# Patient Record
Sex: MALE | Race: WHITE
[De-identification: names, ages, dates, MRNs, and addresses within clinical notes are randomized per-mention and may not be internally consistent; named-entity substitution may affect disease eponyms.]

---

## 2019-06-09 ENCOUNTER — HOSPITAL ENCOUNTER (EMERGENCY)
Dept: HOSPITAL 62 - ER | Age: 24
LOS: 1 days | Discharge: HOME | End: 2019-06-10
Payer: OTHER GOVERNMENT

## 2019-06-09 DIAGNOSIS — I80.8: Primary | ICD-10-CM

## 2019-06-09 DIAGNOSIS — N48.89: ICD-10-CM

## 2019-06-09 PROCEDURE — 99284 EMERGENCY DEPT VISIT MOD MDM: CPT

## 2019-06-09 PROCEDURE — 93976 VASCULAR STUDY: CPT

## 2019-06-09 PROCEDURE — 87088 URINE BACTERIA CULTURE: CPT

## 2019-06-09 PROCEDURE — 81001 URINALYSIS AUTO W/SCOPE: CPT

## 2019-06-09 PROCEDURE — 87086 URINE CULTURE/COLONY COUNT: CPT

## 2019-06-09 PROCEDURE — 76857 US EXAM PELVIC LIMITED: CPT

## 2019-06-10 VITALS — DIASTOLIC BLOOD PRESSURE: 73 MMHG | SYSTOLIC BLOOD PRESSURE: 101 MMHG

## 2019-06-10 LAB
APPEARANCE UR: (no result)
APTT PPP: YELLOW S
BILIRUB UR QL STRIP: NEGATIVE
GLUCOSE UR STRIP-MCNC: NEGATIVE MG/DL
KETONES UR STRIP-MCNC: NEGATIVE MG/DL
NITRITE UR QL STRIP: NEGATIVE
PH UR STRIP: 5 [PH] (ref 5–9)
PROT UR STRIP-MCNC: NEGATIVE MG/DL
SP GR UR STRIP: 1.02
UROBILINOGEN UR-MCNC: NEGATIVE MG/DL (ref ?–2)

## 2019-06-10 NOTE — RADIOLOGY REPORT (SQ)
EXAM DESCRIPTION:

US PELVIS LIMITED



COMPLETED DATE/TME:  06/10/2019 00:02



CLINICAL HISTORY:

23 years Male, pain/swelling over shaft of penis



Comparison: None.

LIMITATIONS: Sidedness not confirmed.



FINDINGS:



4.5 x 0.9 cm noncompressible tubular structure marked "anterior

penis area of pain"  may indicate thrombosis of the deep dorsal

vein, consistent with clinically described history of penile

thrombosis.



IMPRESSION:



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

Dorsal Penile Vein Thrombophlebitis.

## 2020-08-26 ENCOUNTER — HOSPITAL ENCOUNTER (EMERGENCY)
Dept: HOSPITAL 62 - ER | Age: 25
Discharge: HOME | End: 2020-08-26
Payer: OTHER GOVERNMENT

## 2020-08-26 VITALS — DIASTOLIC BLOOD PRESSURE: 71 MMHG | SYSTOLIC BLOOD PRESSURE: 124 MMHG

## 2020-08-26 DIAGNOSIS — L23.7: Primary | ICD-10-CM

## 2020-08-26 DIAGNOSIS — F17.290: ICD-10-CM

## 2020-08-26 PROCEDURE — 99283 EMERGENCY DEPT VISIT LOW MDM: CPT

## 2020-08-26 NOTE — ER DOCUMENT REPORT
ED Skin Rash/Insect Bite/Abscs





- General


Chief Complaint: Rash


Stated Complaint: RASH


Time Seen by Provider: 08/26/20 17:10


Mode of Arrival: Ambulatory


Information source: Patient


Notes: 





24-year-old male presented to ED for poison ivy to both arms left thigh and his 

penis.  States he was out pulling some turner and then he noticed the rash on his

arms and then on his thigh and now it is on his penis.  He states he and his 

wife did clean the couch because they thought it was poison ivy and they thought

that is why it was going to other places.


TRAVEL OUTSIDE OF THE [place] IN LAST 30 DAYS: No





- HPI


Patient complains to provider of: Skin rash/lesion, Other - Poison ivy


Onset: Other - Couple days


Onset/Duration: Gradual


Quality of pain: No pain


Severity: None


Pain Level: Denies


Skin Character: Rash


Quality of rash: Itchy


Identify cause: Yes


Other exposure: Poison ivy


Exacerbated by: Denies


Relieved by: Denies


Similar symptoms previously: Yes


Recently seen / treated by doctor: No





- Related Data


Allergies/Adverse Reactions: 


                                        





No Known Allergies Allergy (Verified 08/26/20 17:04)


   











Past Medical History





- General


Information source: Patient





- Social History


Smoking Status: Current Some Day Smoker - Also vapes


Frequency of alcohol use: Occasional


Drug Abuse: None


Occupation: Active duty


Lives with: Family


Family History: Reviewed & Not Pertinent


Patient has suicidal ideation: No


Patient has homicidal ideation: No





- Past Medical History


Cardiac Medical History: Reports: None


Pulmonary Medical History: Reports: None


EENT Medical History: Reports: None


Neurological Medical History: Reports: None


Endocrine Medical History: Reports: None


Renal/ Medical History: Reports: None


Malignancy Medical History: Reports None


GI Medical History: Reports: None


Musculoskeletal Medical History: Reports Hx Musculoskeletal Trauma


Skin Medical History: Reports None


Psychiatric Medical History: Reports: None


Traumatic Medical History: Reports: Hx Fractures - Arm


Infectious Medical History: Reports: None


Surgical Hx: Negative


Past Surgical History: Reports: None





- Immunizations


Immunizations up to date: Yes


Hx Diphtheria, Pertussis, Tetanus Vaccination: Yes





Review of Systems





- Review of Systems


Constitutional: No symptoms reported


EENT: No symptoms reported


Cardiovascular: No symptoms reported


Respiratory: No symptoms reported


Gastrointestinal: No symptoms reported


Genitourinary: No symptoms reported


Male Genitourinary: No symptoms reported


Musculoskeletal: No symptoms reported


Skin: Rash - Both arms left thigh and penis


Hematologic/Lymphatic: No symptoms reported


Neurological/Psychological: No symptoms reported





Physical Exam





- Vital signs


Vitals: 


                                        











Temp Pulse Resp BP Pulse Ox


 


 98.1 F   72   16   124/71   98 


 


 08/26/20 17:02  08/26/20 17:02  08/26/20 17:02  08/26/20 17:02  08/26/20 17:02











Interpretation: Normal





- General


General appearance: Appears well, Alert





- HEENT


Head: Normocephalic, Atraumatic


Eyes: Normal


Pupils: PERRL





- Respiratory


Respiratory status: No respiratory distress


Chest status: Nontender


Breath sounds: Normal


Chest palpation: Normal





- Cardiovascular


Rhythm: Regular


Heart sounds: Normal auscultation


Murmur: No





- Abdominal


Inspection: Normal


Distension: No distension


Bowel sounds: Normal


Tenderness: Nontender


Organomegaly: No organomegaly





- Back


Back: Normal, Nontender





- Extremities


General upper extremity: Normal inspection, Nontender, Normal color, Normal ROM,

Normal temperature


General lower extremity: Normal inspection, Nontender, Normal color, Normal ROM,

Normal temperature, Normal weight bearing.  No: Jesus's sign





- Neurological


Neuro grossly intact: Yes


Cognition: Normal


Orientation: AAOx4


Michell Coma Scale Eye Opening: Spontaneous


Michell Coma Scale Verbal: Oriented


[place] Coma Scale Motor: Obeys Commands


[place] Coma Scale Total: 15


Speech: Normal


Motor strength normal: LUE, RUE, LLE, RLE


Sensory: Normal





- Psychological


Associated symptoms: Normal affect, Normal mood





- Skin


Skin Temperature: Warm


Skin Moisture: Dry


Skin Color: Normal


Skin irregularity: Rash


Location of irregularity: Extremities - Both arms left thigh and penis


Character of irregularity: Vesicular





Course





- Vital Signs


Vital signs: 


                                        











Temp Pulse Resp BP Pulse Ox


 


 98.1 F   72   16   124/71   98 


 


 08/26/20 17:02  08/26/20 17:02  08/26/20 17:02  08/26/20 17:02  08/26/20 17:02














Discharge





- Discharge


Clinical Impression: 


 Poison ivy





Condition: Stable


Disposition: HOME, SELF-CARE


Additional Instructions: 


Poison Ivy





     Poison ivy and poison oak can cause an itchy rash. This is called contact 

dermatitis. It's an allergy to an oil in the plant's leaves. The oil can be 

spread from clothing to skin, from pets to humans, or from one spot on the body 

to another. Washing thoroughly with soap immediately after exposure can prevent 

the rash. (Clothing should be washed as well.)


     If the oil is not removed, an itchy rash develops a few days after the 

exposure. Blisters may develop. Two to three weeks may be required for healing.


     Generally, treatment consists of: 


     (1) an immediate thorough washing with soap to remove the oil, 


     (2) application of a cortisone cream, and 


     (3) antihistamines for itching.


     If the reaction is particularly severe, oral cortisone medicine may be 

required. If there are oozing areas, these can be soaked in epsom salts or 

Burrow's solution.


     Call the doctor if the rash worsens despite treatment, or if signs of 

infection occur such as spreading redness, red streaks, swollen glands, 

swelling, or fever.





ACUTE ALLERGIC REACTION:





     Your symptoms are due to an allergic reaction.  Allergy can cause hives, 

swelling of the hands, feet, and face, hoarseness, and difficulty swallowing or 

breathing.  It may be due to exposure to medication, animal dander, foods, 

infection, or insect bites. Medication is a common cause, even when prior use of

this same medication caused no problems.


     Acute treatment may include adrenalin and antihistamines. Usually, the 

specific allergic agent can't be identified unless repeated episodes occur.


     Home treatment includes the following: 


(1) Stop any suspicious medications.  This will be discussed with you.  


(2) Oral antihistamines for the next four to five days. Example, diphenhydramine

(Benadryl) every four hours.


(3) You may also use cimetidine (Tagamet), ranitidine (Zantac), or famotidine (P

epcid) every four hours if diphenhydramine is not controlling itching and hives.




(4) Avoid aspirin until the hives completely disappear.  


(5) Avoid hot baths or showers until the hives are completely gone.


     Call the doctor if faintness, difficulty swallowing, tightness in the 

chest, or wheezing occurs.





STEROID MEDICATION:


     You have been given a medicine of the cortisone/steroid class.  This 

medication is used to control inflammation or allergy.  It is usually only given

for a short period of time, until the acute process subsides.


     There are usually no side effects from short-term use of cortisone-like 

medications.  Some persons feel an increased sense of well-being and are not 

sleepy at bedtime.  Long-term use of cortisone medications is best avoided, 

unless required for a severe condition.  If your condition does not remit, or 

relapses after the course of corticosteroid medication, you should consult your 

physician.








ACID-SUPPRESSING MEDICATION:


     You have a prescription for medicine which reduces the stomach's secretion 

of acid.  Examples include Zantac, Tagament, and Pepcid.  These drugs are often 

used to allow healing of ulcers or esophagitis.  They may be needed to prevent 

recurrence of ulcers in some patients, or to prevent damage from acid reflux in 

the esophagus.


     Take all medication as prescribed, even after the pain is gone. Regular 

antacids may be added as needed if you have symptoms while taking this medicine.


     These medications sometimes are prescribed for allergic reactions because 

they have anti-histaminic effects and relieve the rash and itching of the 

reaction.


     There are usually no side effects from this medication.  But, in rare cases

and particularly in the elderly, serious problems can occur. Contact your doctor

if there is fever, rash, hallucinations, confusion, or unusual bruising.


     Contact your doctor at once if you develop lightheadedness, black or bloody

stool, or bloody vomitus.








USE OF DIPHENHYDRAMINE:


     The use of diphenhydramine (Benadryl) has been recommended to control 

allergic symptoms.  The 25 mg strength is available over- the-counter, as well 

as the elixir.  This antihistamine is used for many symptoms.  It's useful for 

itching, watering eyes and nose, allergic swelling, hives, and insect stings.  

The medication can be repeated four times daily.


     Age         Elixir (12.5 mg/tsp)         25 mg pill


     2-3 yr      1/2 tsp


     4-8 yr      1 tsp


     9-14 yr     2 tsp                        one tab


     adult                                    1-2 tabs


     Antihistamines may cause drowsiness, especially with the first dose.  Do 

not operate machinery or drive while under the effects of the medication.  Do 

not combine the medication with alcohol, or with any other medication without 

talking to your doctor.








FOLLOW-UP CARE:


If you have been referred to a physician for follow-up care, call the 

physicians office for an appointment as you were instructed or within the next 

two days.  If you experience worsening or a significant change in your symptoms,

notify the physician immediately or return to the Emergency Department at any 

time for re-evaluation.





Prescriptions: 


Prednisone [Deltasone 10 mg Tablet] 10 mg PO ASDIR PRN #21 tablet


 PRN Reason: 


Famotidine [Pepcid 20 mg Tablet] 20 mg PO DAILY #12 tablet


Forms:  Smoking Cessation Education

## 2020-09-01 ENCOUNTER — HOSPITAL ENCOUNTER (EMERGENCY)
Dept: HOSPITAL 62 - ER | Age: 25
Discharge: HOME | End: 2020-09-01
Payer: OTHER GOVERNMENT

## 2020-09-01 VITALS — DIASTOLIC BLOOD PRESSURE: 62 MMHG | SYSTOLIC BLOOD PRESSURE: 124 MMHG

## 2020-09-01 DIAGNOSIS — L23.7: Primary | ICD-10-CM

## 2020-09-01 PROCEDURE — 99284 EMERGENCY DEPT VISIT MOD MDM: CPT

## 2020-09-01 PROCEDURE — 96372 THER/PROPH/DIAG INJ SC/IM: CPT

## 2020-09-01 NOTE — ER DOCUMENT REPORT
HPI





- HPI


Time Seen by Provider: 09/01/20 13:07


Notes: 





24-year-old male presents to the emergency room for reevaluation of a rash on 

his bilateral arms and abdomen and upper thighs, was seen in the emergency room 

on August 26th 2020, was given oral steroids which do to give him some relief 

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 

states his rash has been getting worse.  Denies any new interactions with any 

poison ivy.  Has not tried any Benadryl or over-the-counter medications.  

Reports rash is itchy.  Vaccinations are up-to-date for age.  Denies fevers, 

chills,  chest pain,palpitations,  shortness of breath, dyspnea, nausea, 

vomiting, diarrhea, abdominal pain, hematuria,blurred vision, double vision, 

loss of vision, speech changes, LH, dizziness, syncope, headaches, wheezing, ST,

URI, neck pain, weakness, bowel or bladder dysfunction, saddle anesthesia, 

numbness or tingling in bilateral upper or lower extremities equally, muscle 

paralysis, weakness in bilateral upper or lower extremities equally. Denies IV 

drug use.








MEDICATIONS: I agree with the patient medications as charted by the RN.





ALLERGIES: I agree with the allergies as charted by the RN.





PAST MEDICAL HISTORY/PAST SURGICAL HISTORY: Reviewed and agree as charted by RN.





SOCIAL HISTORY: Reviewed and agree as charted by RN.





FAMILY HISTORY: No significant familial comorbid conditions directly related to 

patient complaint





EXAM:


Reviewed vital signs as charted by RN.





REVIEW OF SYSTEMS:reviewed vital signs by RN


CONSTITUTIONAL :  Denies fever,  chills, or sweats.  Denies recent illness.


EENT:   Denies eye, ear, throat, or mouth pain or symptoms.  Denies nasal or 

sinus congestion or discharge.  Denies throat, tongue, or mouth swelling or 

difficulty swallowing.


CARDIOVASCULAR:  Denies chest pain.  Denies palpitations or racing or irregular 

heart beat.  Denies ankle edema.


RESPIRATORY:  Denies cough, cold, or chest congestion.  Denies shortness of 

breath, difficulty breathing, or wheezing.


GASTROINTESTINAL:  Denies abdominal pain or distention.  Denies nausea, 

vomiting, or diarrhea.  Denies blood in vomitus, stools, or per rectum.  Denies 

black, tarry stools.  Denies constipation.  


GENITOURINARY:  Denies difficulty urinating, painful urination, burning, 

frequency, blood in urine, or discharge.


MUSCULOSKELETAL:  Denies back or neck pain or stiffness.  Denies joint pain or 

swelling.


SKIN: Reports rash on arms, legs, abdomen.  denies rash, lesions or sores.


HEMATOLOGIC :   Denies easy bruising or bleeding.


LYMPHATIC:  Denies swollen, enlarged glands.


NEUROLOGICAL:  Denies confusion or altered mental status.  Denies passing out or

loss of consciousness.  Denies dizziness or lightheadedness.  Denies headache.  

Denies weakness or paralysis or loss of use of either side.  Denies problems 

with gait or speech.  Denies sensory loss, numbness, or tingling.  Denies 

seizures.


PSYCHIATRIC:  Denies anxiety or stress.  Denies depression, suicidal ideation, 

or homicidal ideation.





ALL OTHER SYSTEMS REVIEWED AND NEGATIVE.





Dictation was performed using Dragon voice recognition software 





PHYSICAL EXAMINATION:





GENERAL: Well-appearing, well-nourished and in no acute distress.





HEAD: Atraumatic, normocephalic.





EYES: Pupils equal round and reactive to light, extraocular movements intact, 

sclera anicteric, conjunctiva are normal.





ENT: Nares patent, oropharynx clear without exudates.  Moist mucous membranes.





NECK: Normal range of motion, supple without lymphadenopathy





LUNGS: Breath sounds clear to auscultation bilaterally and equal.  No wheezes 

rales or rhonchi.





HEART: Regular rate and rhythm without murmurs





ABDOMEN: Soft, nontender, nondistended abdomen.  No guarding, no rebound.  No 

masses appreciated.





Musculoskeletal: Normal range of motion, no pitting or edema.  No cyanosis.





NEUROLOGICAL: Cranial nerves grossly intact.  Normal speech, normal gait.  

Normal sensory, motor exams 





PSYCH: Normal mood, normal affect.





SKIN: Warm, Dry, normal turgor, no rashes or lesions noted.   Macular papular 

rash in leaf-like pattern on forearms, abdomen  and upper thighs.  No satellite 

lesions, burrowing, open wounds or drainage.











- REPRODUCTIVE


Reproductive: DENIES: Pregnant:





Past Medical History





- General


Information source: Patient





- Social History


Smoking Status: Unknown if Ever Smoked


Family History: Reviewed & Not Pertinent


Renal/ Medical History: Denies: Hx Peritoneal Dialysis


Musculoskeletal Medical History: Reports Hx Musculoskeletal Trauma


Traumatic Medical History: Reports: Hx Fractures - Arm





- Immunizations


Immunizations up to date: Yes


Hx Diphtheria, Pertussis, Tetanus Vaccination: Yes





Vertical Provider Document





- CONSTITUTIONAL


Agree With Documented VS: Yes


Exam Limitations: No Limitations


General Appearance: WD/WN





- INFECTION CONTROL


TRAVEL OUTSIDE OF THE [place] IN LAST 30 DAYS: No





Course





- Re-evaluation


Re-evalutation: 





09/01/20 14:07


Afebrile vital stable no distress.  Nurses notes reviewed.  Patient given 10 mg 

Decadron IM as well as given oral prednisone and steroid ointment for his poison

oak, advised to also take Benadryl over-the-counter as needed.   Vitals 

otherwise within normal limits at time of arrival.  No respiratory, GI, 

cardiovascular, or oral pharyngeal symptoms.  Will recommend ongoing 

antihistamine and steroids therapy as an outpatient. At this time will discharge

with return precautions and follow-up recommendations.  Verbal discharge 

instructions given a the bedside and opportunity for questions given. Medication

warnings reviewed. Patient is in agreement with this plan and has verbalized 

understanding of return precautions and the need for primary care follow-up in 

the next 24-72 hours.  After performing a Medical Screening Examination, I 

estimate there is LOW risk for any life threatening rash. At this time the 

patient looks extremely well and there are no signs of systemic infection, 

however this may change at any time and the rash may change.  I have reevaluated

this patient multiple times and no significant life threatening changes are 

noted. The patient and I have discussed the diagnosis and risks, and we agree 

with discharging home with close follow-up with the understanding that symptoms 

and presentations can change. We also discussed returning to the Emergency 

Department immediately if new or worsening symptoms occur. We have discussed the

symptoms which are most concerning (e.g., changing or worsening pain, fever, 

numbness, weakness, cool or painful digits) that necessitate immediate return.





- Vital Signs


Vital signs: 


                                        











Temp Pulse Resp BP Pulse Ox


 


 98.5 F   70   16   124/62   98 


 


 09/01/20 12:14  09/01/20 12:14  09/01/20 12:14  09/01/20 12:14  09/01/20 12:14














Discharge





- Discharge


Clinical Impression: 


 Poison ivy





Condition: Stable


Disposition: HOME, SELF-CARE


Instructions:  Corticosteroid Medication ([facility]), Topical Steroid Cream or 

Ointment ([facility]), Contact Dermatitis ([facility])


Additional Instructions: 


Poison Ivy





     Poison ivy and poison oak can cause an itchy rash. This is called contact 

dermatitis. It's an allergy to an oil in the plant's leaves. The oil can be 

spread from clothing to skin, from pets to humans, or from one spot on the body 

to another. Washing thoroughly with soap immediately after exposure can prevent 

the rash. (Clothing should be washed as well.)


     If the oil is not removed, an itchy rash develops a few days after the 

exposure. Blisters may develop. Two to three weeks may be required for healing.


     Generally, treatment consists of: 


     (1) an immediate thorough washing with soap to remove the oil, 


     (2) application of a cortisone cream, and 


     (3) antihistamines for itching.


     If the reaction is particularly severe, oral cortisone medicine may be 

required. If there are oozing areas, these can be soaked in epsom salts or 

Burrow's solution.


     Call the doctor if the rash worsens despite treatment, or if signs of 

infection occur such as spreading redness, red streaks, swollen glands, 

swelling, or fever.








Return immediately for any new or worsening symptoms.





Follow up with primary care provider, call tomorrow to make followup 

appointment.


Prescriptions: 


Triamcinolone Acetonide [Aristocort 0.025% Cream] 1 applic TP BID #80 gram


Prednisone [Deltasone 20 mg Tablet] 3 tab PO DAILY 5 Days #15 tablet


Forms:  Return to Work


Referrals: 


JACQUELIN CARLOS MD [ACTIVE STAFF] - Follow up as needed

## 2023-09-26 NOTE — ER DOCUMENT REPORT
ED GI/





- General


Chief Complaint: Scrotal Pain, Acute Onset


Stated Complaint: POSS BLOOD CLOT IN GROIN


Time Seen by Provider: 06/09/19 23:56


Notes: 


Patient is a 23-year-old male that comes emergency department for chief 

complaint of pain, swelling, and possible blood clot over the genital area.  He 

states that he was seen at [facility] yesterday, he states they told him he 

had a blood clot in his penis, he states that he was told to put warm 

compresses.  He states he came because it still hurts today.  He denies 

difficulty with urination, dysuria, hematuria, discharge, injury, testicular 

pain or swelling, groin pain, abdominal pain. He is still able to get an 

erection without noted change. He denies fever/chills, nausea/vomiting.  He is 

an [employer].





TRAVEL OUTSIDE OF THE [place] IN LAST 30 DAYS: No





- Related Data


Allergies/Adverse Reactions: 


                                        





No Known Allergies Allergy (Unverified 06/10/19 01:17)


   











Past Medical History





- General


Information source: Patient





- Social History


Smoking Status: Never Smoker


Drug Abuse: None


Lives with: Family


Family History: Reviewed & Not Pertinent





- Medical History


Medical History: Negative


Surgical Hx: Negative





- Immunizations


Immunizations up to date: Yes


Hx Diphtheria, Pertussis, Tetanus Vaccination: Yes





Review of Systems





- Review of Systems


Constitutional: No symptoms reported


EENT: No symptoms reported


Cardiovascular: No symptoms reported


Respiratory: No symptoms reported


Gastrointestinal: No symptoms reported


Genitourinary: No symptoms reported


Male Genitourinary: See HPI


Musculoskeletal: No symptoms reported


Skin: No symptoms reported


Hematologic/Lymphatic: No symptoms reported


Neurological/Psychological: No symptoms reported





Physical Exam





- Vital signs


Vitals: 


                                        











Temp Pulse Resp BP Pulse Ox


 


 97.8 F   52 L  16   154/66 H  98 


 


 06/09/19 23:22  06/09/19 23:22  06/09/19 23:22  06/09/19 23:22  06/09/19 23:22














- Notes


Notes: 





GENERAL: Alert, interacts well. No acute distress.


HEAD: Normocephalic, atraumatic.


EYES: Pupils equal, round, and reactive to light. Extraocular movements intact.


ENT: Oral mucosa moist, tongue midline. Oropharynx unremarkable. Airway patent. 


NECK: Full range of motion. Supple. Trachea midline.


LUNGS: Clear to auscultation bilaterally, no wheezes, rales, or rhonchi. No 

respiratory distress.


HEART: Regular rate and rhythm. No murmur


ABDOMEN: Soft, non-tender. Non-distended. Bowel sounds present in all 4 

quadrants.


GENITOURINARY: There appears to be minimal swelling both on appearance and on 

palpation with minimal extra warmth and erythema over the dorsum of the penis 

starting from the base and extending most of the way towards the head of the 

penis.  There is no rash, lesion, or noted abnormality otherwise.  Normal 

testicular/scrotum exam, normal groin exam. 


EXTREMITIES: Moves all 4 extremities spontaneously. No edema, normal radial and 

dorsalis pedis pulses bilaterally. No cyanosis.


BACK: no cervical, thoracic, lumbar midline tenderness. No saddle anesthesia, 

normal distal neurovascular exam. Moves all extremities in full range of motion.


NEUROLOGICAL: Alert and oriented x3. Normal speech. Cranial nerves II through 

XII grossly intact. 


PSYCH: Normal affect, normal mood.


SKIN: Warm, dry, normal turgor. No rashes or lesions noted.





Course





- Re-evaluation


Re-evalutation: 


Ultrasound result showing Dorsal Penile Vein Thrombophlebitis.  Patient has had 

no trauma, there is no discernible wound, this is consistent with patient's 

physical exam.  He does not have a fever or any other concerning findings.  

Discussed with Dr. Welch, recommends consultation with urology.





I discussed with urology on-call at [facility], Dr. Garnica, recommendation 

is to place patient on Bactrim, have him call his command and primary care for 

follow-up and then urology referral for a check.  Patient is to return if he 

worsens, return precautions were discussed.  I discussed this plan with patient,

patient states satisfaction and agreement with plan.





- Vital Signs


Vital signs: 


                                        











Temp Pulse Resp BP Pulse Ox


 


 97.9 F   49 L  16   101/73   98 


 


 06/10/19 02:56  06/10/19 02:56  06/10/19 02:56  06/10/19 02:56  06/10/19 02:56














- Laboratory


Laboratory results interpreted by me: 


                                        











  06/10/19





  00:23


 


Ur Leukocyte Esterase  SMALL H














Discharge





- Discharge


Clinical Impression: 


 Penile pain





Condition: Stable


Disposition: HOME, SELF-CARE


Additional Instructions: 


Your evaluation shows Dorsal Penile Vein Thrombophlebitis. 


I spoke with Urologist Dr. Nahun dennis. Recommendation is taking the Bactrim 

antibiotic and close followup with primary care for Urology referral/evaluation.

Call them this morning to set this up. 





We have a urine culture pending. You will be contacted for any concerning 

results of this. 





Return if you worsen - increased swelling, increased pain, fever, or any other 

concerning symptoms. 





Prescriptions: 


Sulfamethoxazole/Trimethoprim [Bactrim Ds Tablet] 1 each PO BID #14 tablet Prednisone Counseling:  I discussed with the patient the risks of prolonged use of prednisone including but not limited to weight gain, insomnia, osteoporosis, mood changes, diabetes, susceptibility to infection, glaucoma and high blood pressure.  In cases where prednisone use is prolonged, patients should be monitored with blood pressure checks, serum glucose levels and an eye exam.  Additionally, the patient may need to be placed on GI prophylaxis, PCP prophylaxis, and calcium and vitamin D supplementation and/or a bisphosphonate.  The patient verbalized understanding of the proper use and the possible adverse effects of prednisone.  All of the patient's questions and concerns were addressed.